# Patient Record
Sex: MALE | Race: BLACK OR AFRICAN AMERICAN | NOT HISPANIC OR LATINO | URBAN - METROPOLITAN AREA
[De-identification: names, ages, dates, MRNs, and addresses within clinical notes are randomized per-mention and may not be internally consistent; named-entity substitution may affect disease eponyms.]

---

## 2024-06-08 ENCOUNTER — HOSPITAL ENCOUNTER (EMERGENCY)
Facility: HOSPITAL | Age: 59
Discharge: HOME | End: 2024-06-08
Attending: EMERGENCY MEDICINE
Payer: MEDICAID

## 2024-06-08 VITALS
WEIGHT: 245 LBS | HEART RATE: 80 BPM | DIASTOLIC BLOOD PRESSURE: 80 MMHG | OXYGEN SATURATION: 95 % | BODY MASS INDEX: 36.29 KG/M2 | RESPIRATION RATE: 16 BRPM | SYSTOLIC BLOOD PRESSURE: 132 MMHG | HEIGHT: 69 IN | TEMPERATURE: 99 F

## 2024-06-08 DIAGNOSIS — H10.32 ACUTE CONJUNCTIVITIS OF LEFT EYE, UNSPECIFIED ACUTE CONJUNCTIVITIS TYPE: Primary | ICD-10-CM

## 2024-06-08 DIAGNOSIS — R21 RASH: ICD-10-CM

## 2024-06-08 PROCEDURE — 25000003 PHARM REV CODE 250

## 2024-06-08 PROCEDURE — 99283 EMERGENCY DEPT VISIT LOW MDM: CPT

## 2024-06-08 RX ORDER — ERYTHROMYCIN 5 MG/G
OINTMENT OPHTHALMIC
Qty: 1 G | Refills: 0 | Status: SHIPPED | OUTPATIENT
Start: 2024-06-08

## 2024-06-08 RX ORDER — PROPARACAINE HYDROCHLORIDE 5 MG/ML
1 SOLUTION/ DROPS OPHTHALMIC
Status: COMPLETED | OUTPATIENT
Start: 2024-06-08 | End: 2024-06-08

## 2024-06-08 RX ADMIN — PROPARACAINE HYDROCHLORIDE 1 DROP: 5 SOLUTION/ DROPS OPHTHALMIC at 12:06

## 2024-06-08 RX ADMIN — FLUORESCEIN SODIUM 1 EACH: 1 STRIP OPHTHALMIC at 12:06

## 2024-06-08 NOTE — ED NOTES
Patient identifiers verified and correct for Mr Hernandez   C/C:  Left eye redness since Tuesday  APPEARANCE: awake and alert in NAD. PAIN  10/10  SKIN: warm, dry and intact. No breakdown or bruising.  MUSCULOSKELETAL: Patient moving all extremities spontaneously, no obvious swelling or deformities noted. Ambulates independently.  RESPIRATORY: Denies shortness of breath.Respirations unlabored.   CARDIAC: Denies CP, 2+ distal pulses; no peripheral edema  ABDOMEN: S/ND/NT, Denies nausea  : voids spontaneously, denies difficulty  Neurologic: AAO x 4; follows commands equal strength in all extremities; denies numbness/tingling. Denies dizziness  Denies new weakness, reports blurred vision/shade

## 2024-06-08 NOTE — ED PROVIDER NOTES
Encounter Date: 6/8/2024       History     Chief Complaint   Patient presents with    Eye Problem     Right eye redness. Denies pain. No change in vision. Also reports rash on arms.      59-year-old male with history of hypertension, CHF, and lupus presents to the ED regarding left eye redness and drainage onset 4 days.  Denies any pain but does admit to blurred vision.  No sudden vision loss.  No flashes or floaters.  Denies fever, body aches, chills, or other complaints at this time.  Of note, patient also complains of rash to bilateral arms for the past 2 weeks.  Describes them as small bumps mostly around the elbows and complains of itchiness.  No redness.  No drainage or other complaints.    The history is provided by the patient and medical records.     Review of patient's allergies indicates:   Allergen Reactions    Bactrim [sulfamethoxazole-trimethoprim] Anaphylaxis     States he was hospital for reaction      Past Medical History:   Diagnosis Date    CHF (congestive heart failure)     Coronary artery disease     Diabetes mellitus     Hypertension     Renal disorder     Systemic lupus erythematosus, organ or system involvement unspecified      History reviewed. No pertinent surgical history.  No family history on file.  Social History     Tobacco Use    Smoking status: Never    Smokeless tobacco: Never   Substance Use Topics    Alcohol use: Not Currently    Drug use: Not Currently     Review of Systems    Physical Exam     Initial Vitals [06/08/24 1201]   BP Pulse Resp Temp SpO2   132/80 80 16 98.5 °F (36.9 °C) 95 %      MAP       --         Physical Exam    Vitals reviewed.  Constitutional: He appears well-developed and well-nourished. He is not diaphoretic. No distress.   HENT:   Head: Normocephalic and atraumatic.   Eyes: EOM and lids are normal. Pupils are equal, round, and reactive to light. Left conjunctiva is injected.   Erythema to the medial conjunctiva left eye.  No active drainage.  No  exudates.  Some fluorescein uptake seen with Wood's lamp to sclera inferiorly.   Cardiovascular:  Normal rate.           Pulmonary/Chest: No respiratory distress.     Neurological: He is alert and oriented to person, place, and time.   Skin: Rash noted.   Scattered papules to bilateral elbows visualized with no erythema, edema, or tenderness.   Psychiatric: He has a normal mood and affect.         ED Course   Procedures  Labs Reviewed   HIV 1 / 2 ANTIBODY   HEPATITIS C ANTIBODY          Imaging Results    None          Medications   fluorescein ophthalmic strip 1 each (1 each Both Eyes Given by Other 6/8/24 1234)   proparacaine 0.5 % ophthalmic solution 1 drop (1 drop Both Eyes Given by Other 6/8/24 1234)     Medical Decision Making  Amount and/or Complexity of Data Reviewed  Labs: ordered.    Risk  Prescription drug management.         APC / Resident Notes:   Emergent evaluation a 59-year-old male presenting with painless left eye erythema accompanied with blurred vision onset 4 days.  Also with bumpy, itchy rash to bilateral arms onset 2 weeks. Vitals WNL.  Clinically well-appearing, in no acute distress.  See physical exam findings above. No tonopen in ED currently, discussed this with patient. Lower suspicion for acute glaucoma given no eye pain. Likely conjunctivitis. Regarding rash, no signs of cellulitis. Bumps do not appear consistent with scabies. Will refer to dermatology.    My differential diagnoses include but are not limited to:   Viral conjunctivitis, bacterial conjunctivitis, abrasion, ulceration, glaucoma, rash, dermatitis, cellulitis    See ED course.  I have reviewed the patient's records and discussed with my supervising physician.       Attending Attestation:     Physician Attestation Statement for NP/PA:   I personally made/approved the management plan and take responsibility for the patient management.    Other NP/PA Attestation Additions:    History of Present Illness: Conjunctivitis and rash                ED Course as of 06/08/24 1407   Sat Jun 08, 2024   1240 Right Eye   Right Visual Status Uncorrected  Right Visual Test 20/40  Left Eye   Left Visual Status Uncorrected  Left Visual Test 20/30  Both Eyes   Both Visual Status Uncorrected  Both Visual Test 20/40 [KB]   1313 Prescribed erythromycin ointment and advised to use Benadryl for rash. Referral placed to ophthalmology and dermatology.  Strict ED return precautions were discussed with all questions answered.  He verbalized understanding and agreed to plan. [KB]      ED Course User Index  [KB] Yolette Duke PA-C                           Clinical Impression:  Final diagnoses:  [H10.32] Acute conjunctivitis of left eye, unspecified acute conjunctivitis type (Primary)  [R21] Rash          ED Disposition Condition    Discharge Stable          ED Prescriptions       Medication Sig Dispense Start Date End Date Auth. Provider    erythromycin (ROMYCIN) ophthalmic ointment Place a 1/2 inch ribbon of ointment into the lower eyelid. 1 g 6/8/2024 -- Yolette Duke PA-C          Follow-up Information       Follow up With Specialties Details Why Contact Info Additional Information    Stephon Lugo - [address] [place] [address] Ophthalmology Schedule an appointment as soon as possible for a visit   [facility] [address] [phone] Please arrive on the 10th floor for check-in.    [facility] Lug[facility] - Dermatology [address] [place] [address] Dermatology Schedule an appointment as soon as possible for a visit   [facility] [address] [phone] Dermatology - Main Building, Clinic 11th Floor Please park in [facility]. Use Clinic elevators 12 & 13 to get to the 11th floor    [facility]phon Lugo - Emergency Dept Emergency Medicine Go to  If symptoms worsen [address] [facility] [id number] [phone]     [facility]   [address] [facility] [address]  [phone]        [facility], [facility] - Vision Ophthalmology, Optometry   [address] [facility] [address]  [phone]                Yolette Duke PA-C  06/08/24 1353       Spike Adam III, MD  06/08/24 1409

## 2024-06-09 NOTE — PLAN OF CARE
SUSANNE faxed an Ambulatory Referral/Consult to Ophthalmology. @[facility] Fax#[phone].    YOSEPH Ornelas, MSW-LMSW  Medical Social Worker/  ER Department

## 2024-06-09 NOTE — PLAN OF CARE
SUSANNE faxed an Ambulatory Referral/Consult to Dermatology @[facility]. Fax#[phone].    YOSEPH Ornelas, MSW-LMSW  Medical Social Worker/  ER Department   .

## 2024-06-26 ENCOUNTER — TELEPHONE (OUTPATIENT)
Dept: DERMATOLOGY | Facility: CLINIC | Age: 59
End: 2024-06-26
Payer: COMMERCIAL